# Patient Record
(demographics unavailable — no encounter records)

---

## 2025-05-08 NOTE — PROCEDURE
[FreeTextEntry1] : CT chest 3/24/2025: Diffuse airway inflammation.  Sub centimeter solid and subsolid nodules.  The largest solid nodule is 5 mm.  The largest subsolid nodule is 7 mm.  Dilated ascending aorta measuring 4.1 cm.  No pleural effusion.  No evident adenopathy.  PFT 5/8/2025: Moderate obstructive airways disease.  Air-trapping was noted.  Diffusion was normal.  No significant change after bronchodilator.

## 2025-05-08 NOTE — DISCUSSION/SUMMARY
[FreeTextEntry1] : Impression Chronic cough and chest congestion -Remains on ACE inhibitor, lisinopril Moderate obstructive airways disease on PFT Pulmonary nodules on CT  Recommend Stop the lisinopril and change to an alternative -At least for several months to see if the cough improves He was given a trial of Trelegy Blood work advised but he declined Can stop montelukast Continue albuterol as needed Follow-up CT of the chest

## 2025-05-08 NOTE — HISTORY OF PRESENT ILLNESS
[Never] : never [Difficulty Maintaining Sleep] : difficulty maintaining sleep [TextBox_4] : 78-year-old man.  He presented in March 2025 with shortness of breath and a cough.  He was placed on albuterol, montelukast and a course of Medrol.  He never smoked.  He denied any history of pulmonary disease.  5/8/2025: He has been on montelukast.  The cough has not improved and has difficulty in producing mucus. He has chest congestion and also nasal congestion.  No constitutional complaints. [Difficulty Initiating Sleep] : does not have difficulty initiating sleep

## 2025-05-08 NOTE — REVIEW OF SYSTEMS
[Cough] : cough [Dyspnea] : dyspnea [Fever] : no fever [Fatigue] : no fatigue [Postnasal Drip] : no postnasal drip [Hemoptysis] : no hemoptysis [Sputum] : no sputum [Wheezing] : no wheezing [Chest Discomfort] : no chest discomfort [Edema] : no edema [Palpitations] : no palpitations [Hay Fever] : no hay fever [Back Pain] : no back pain [Rash] : no rash [Headache] : no headache [Anxiety] : no anxiety [Diabetes] : no diabetes [Thyroid Problem] : no thyroid problem

## 2025-05-08 NOTE — PHYSICAL EXAM
[No Acute Distress] : no acute distress [Normal Appearance] : normal appearance [Normal Rate/Rhythm] : normal rate/rhythm [Normal S1, S2] : normal s1, s2 [No Resp Distress] : no resp distress [Rhonchi] : rhonchi [No Abnormalities] : no abnormalities [Benign] : benign [Normal Gait] : normal gait [No Clubbing] : no clubbing [No Cyanosis] : no cyanosis [No Edema] : no edema [Normal Turgor] : normal turgor [No Focal Deficits] : no focal deficits [No Motor Deficits] : no motor deficits [Oriented x3] : oriented x3 [Normal Affect] : normal affect [No Acc Muscle Use] : no acc muscle use [TextBox_68] : Rhonchi were present bilaterally